# Patient Record
Sex: MALE | URBAN - METROPOLITAN AREA
[De-identification: names, ages, dates, MRNs, and addresses within clinical notes are randomized per-mention and may not be internally consistent; named-entity substitution may affect disease eponyms.]

---

## 2017-08-02 ENCOUNTER — IMPORTED ENCOUNTER (OUTPATIENT)
Dept: URBAN - METROPOLITAN AREA CLINIC 9 | Facility: CLINIC | Age: 57
End: 2017-08-02

## 2017-11-21 ENCOUNTER — IMPORTED ENCOUNTER (OUTPATIENT)
Dept: URBAN - METROPOLITAN AREA CLINIC 9 | Facility: CLINIC | Age: 57
End: 2017-11-21

## 2018-07-27 ENCOUNTER — IMPORTED ENCOUNTER (OUTPATIENT)
Dept: URBAN - METROPOLITAN AREA CLINIC 9 | Facility: CLINIC | Age: 58
End: 2018-07-27

## 2021-10-18 ASSESSMENT — VISUAL ACUITY
OS_SC: 20/60 SN
OS_SC: 20/60 SN
OD_SC: 20/50 SN
OD_SC: 20/60 + SN
OD_SC: 20/50 SN
OS_SC: 20/70 - SN

## 2021-10-18 ASSESSMENT — TONOMETRY
OS_IOP_MMHG: 21
OD_IOP_MMHG: 23

## 2022-08-03 NOTE — PATIENT DISCUSSION
lens changes too advanced, BCVA affected. RLE or monitor, patient would like time to discuss and think about options.
